# Patient Record
Sex: MALE | Race: WHITE | Employment: FULL TIME | URBAN - METROPOLITAN AREA
[De-identification: names, ages, dates, MRNs, and addresses within clinical notes are randomized per-mention and may not be internally consistent; named-entity substitution may affect disease eponyms.]

---

## 2017-04-04 ENCOUNTER — HOSPITAL ENCOUNTER (OUTPATIENT)
Dept: GENERAL RADIOLOGY | Facility: HOSPITAL | Age: 42
Discharge: HOME | End: 2017-04-04
Attending: PREVENTIVE MEDICINE

## 2017-04-04 ENCOUNTER — OFFICE VISIT (OUTPATIENT)
Dept: OTHER | Facility: HOSPITAL | Age: 42
End: 2017-04-04
Attending: PREVENTIVE MEDICINE

## 2017-04-04 DIAGNOSIS — Z00.00 ANNUAL PHYSICAL EXAM: Primary | ICD-10-CM

## 2017-04-04 DIAGNOSIS — Z01.84 IMMUNITY STATUS TESTING: ICD-10-CM

## 2017-04-04 PROCEDURE — 86706 HEP B SURFACE ANTIBODY: CPT

## 2017-04-05 NOTE — H&P
PATIENT'S NAME: Jyotsna Cao   ATTENDING PHYSICIAN: Francisca Villegas M.D.    PATIENT ACCOUNT #: [de-identified] LOCATION: [address] [place] [address] RECORD #: [id number] YOB: 1975   DATE OF SERVICE: 04/04/2017       EXECUTIVE HISTORY AND

## 2017-04-05 NOTE — H&P
PATIENT'S NAME: [facility]   ATTENDING PHYSICIAN: Jimena Agosto M.D.    PATIENT ACCOUNT #: [de-identified] LOCATION: [address] [place] [address] RECORD #: [id number] YOB: 1975   DATE OF SERVICE: 04/04/2017       EXECUTIVE HISTORY AND corrective lenses, 20/16 for distance. He can identify red, yellow, and green. Peripheral vision is intact.     Three Hemoccult cards were submitted and are all normal.     LABORATORY DATA:  Cholesterol panel is normal with a triglyceride elevation of 187

## 2018-03-09 ENCOUNTER — HOSPITAL ENCOUNTER (OUTPATIENT)
Dept: CV DIAGNOSTICS | Age: 43
Discharge: HOME | End: 2018-03-09
Attending: PREVENTIVE MEDICINE

## 2018-03-09 ENCOUNTER — OFFICE VISIT (OUTPATIENT)
Dept: OTHER | Facility: HOSPITAL | Age: 43
End: 2018-03-09

## 2018-03-09 ENCOUNTER — APPOINTMENT (OUTPATIENT)
Dept: OTHER | Age: 43
End: 2018-03-09
Attending: FAMILY MEDICINE

## 2018-03-09 DIAGNOSIS — Z00.00 PHYSICAL EXAM, ANNUAL: Primary | ICD-10-CM

## 2018-03-09 DIAGNOSIS — Z00.00 PHYSICAL EXAM, ANNUAL: ICD-10-CM

## 2019-03-13 ENCOUNTER — OFFICE VISIT (OUTPATIENT)
Dept: OTHER | Age: 44
End: 2019-03-13
Attending: FAMILY MEDICINE

## 2019-03-13 DIAGNOSIS — Z00.00 ANNUAL PHYSICAL EXAM: Primary | ICD-10-CM

## 2019-03-13 PROCEDURE — 93005 ELECTROCARDIOGRAM TRACING: CPT

## 2019-03-14 LAB
ATRIAL RATE: 63 BPM
P AXIS: 39 DEGREES
P-R INTERVAL: 166 MS
Q-T INTERVAL: 394 MS
QRS DURATION: 108 MS
QTC CALCULATION (BEZET): 403 MS
R AXIS: -12 DEGREES
T AXIS: 24 DEGREES
VENTRICULAR RATE: 63 BPM

## 2020-01-13 ENCOUNTER — APPOINTMENT (OUTPATIENT)
Dept: OTHER | Facility: HOSPITAL | Age: 45
End: 2020-01-13
Attending: PREVENTIVE MEDICINE

## 2020-03-16 ENCOUNTER — HOSPITAL ENCOUNTER (OUTPATIENT)
Dept: CV DIAGNOSTICS | Age: 45
Discharge: HOME | End: 2020-03-16
Attending: FAMILY MEDICINE

## 2020-03-16 ENCOUNTER — OFFICE VISIT (OUTPATIENT)
Dept: OTHER | Age: 45
End: 2020-03-16
Attending: FAMILY MEDICINE

## 2020-03-16 DIAGNOSIS — Z00.00 ANNUAL PHYSICAL EXAM: ICD-10-CM

## 2020-03-16 DIAGNOSIS — Z00.00 ANNUAL PHYSICAL EXAM: Primary | ICD-10-CM

## 2021-02-09 ENCOUNTER — OFFICE VISIT (OUTPATIENT)
Dept: OTHER | Facility: HOSPITAL | Age: 46
End: 2021-02-09
Attending: PREVENTIVE MEDICINE

## 2021-02-09 DIAGNOSIS — Z00.00 ANNUAL PHYSICAL EXAM: Primary | ICD-10-CM

## 2021-02-09 LAB
ATRIAL RATE: 67 BPM
P AXIS: 51 DEGREES
P-R INTERVAL: 186 MS
Q-T INTERVAL: 370 MS
QRS DURATION: 106 MS
QTC CALCULATION (BEZET): 390 MS
R AXIS: -4 DEGREES
T AXIS: 28 DEGREES
VENTRICULAR RATE: 67 BPM

## 2022-03-09 ENCOUNTER — OFFICE VISIT (OUTPATIENT)
Dept: OTHER | Facility: HOSPITAL | Age: 47
End: 2022-03-09
Attending: PREVENTIVE MEDICINE

## 2022-03-09 ENCOUNTER — HOSPITAL ENCOUNTER (OUTPATIENT)
Dept: GENERAL RADIOLOGY | Facility: HOSPITAL | Age: 47
Discharge: HOME | End: 2022-03-09
Attending: PREVENTIVE MEDICINE

## 2022-03-09 DIAGNOSIS — Z00.00 ANNUAL PHYSICAL EXAM: Primary | ICD-10-CM

## 2022-03-09 DIAGNOSIS — Z00.00 ANNUAL PHYSICAL EXAM: ICD-10-CM

## 2022-03-10 LAB
ATRIAL RATE: 70 BPM
P AXIS: 48 DEGREES
P-R INTERVAL: 176 MS
Q-T INTERVAL: 382 MS
QTC CALCULATION (BEZET): 412 MS
R AXIS: 3 DEGREES
T AXIS: 28 DEGREES
VENTRICULAR RATE: 70 BPM

## 2023-03-10 ENCOUNTER — OFFICE VISIT (OUTPATIENT)
Dept: OTHER | Facility: HOSPITAL | Age: 48
End: 2023-03-10
Attending: PREVENTIVE MEDICINE

## 2023-03-10 DIAGNOSIS — Z00.00 ANNUAL PHYSICAL EXAM: Primary | ICD-10-CM

## 2023-03-10 LAB
ATRIAL RATE: 68 BPM
P AXIS: 46 DEGREES
P-R INTERVAL: 174 MS
Q-T INTERVAL: 382 MS
QRS DURATION: 106 MS
QTC CALCULATION (BEZET): 406 MS
R AXIS: -5 DEGREES
T AXIS: 23 DEGREES
VENTRICULAR RATE: 68 BPM

## 2023-03-10 PROCEDURE — 93005 ELECTROCARDIOGRAM TRACING: CPT

## 2024-02-21 ENCOUNTER — OFFICE VISIT (OUTPATIENT)
Dept: OTHER | Facility: HOSPITAL | Age: 49
End: 2024-02-21
Attending: PREVENTIVE MEDICINE

## 2024-02-21 DIAGNOSIS — Z02.89 VISIT FOR OCCUPATIONAL HEALTH EXAMINATION: Primary | ICD-10-CM

## 2024-02-21 LAB
ATRIAL RATE: 79 BPM
P AXIS: 59 DEGREES
P-R INTERVAL: 182 MS
Q-T INTERVAL: 372 MS
QRS DURATION: 106 MS
QTC CALCULATION (BEZET): 426 MS
R AXIS: -10 DEGREES
T AXIS: 29 DEGREES
VENTRICULAR RATE: 79 BPM

## 2024-02-21 PROCEDURE — 93005 ELECTROCARDIOGRAM TRACING: CPT

## 2024-02-22 LAB
ATRIAL RATE: 66 BPM
P AXIS: 41 DEGREES
P-R INTERVAL: 178 MS
Q-T INTERVAL: 382 MS
QRS DURATION: 106 MS
QTC CALCULATION (BEZET): 400 MS
R AXIS: -10 DEGREES
T AXIS: 24 DEGREES
VENTRICULAR RATE: 66 BPM

## 2025-03-20 ENCOUNTER — OFFICE VISIT (OUTPATIENT)
Dept: OTHER | Facility: HOSPITAL | Age: 50
End: 2025-03-20
Attending: PREVENTIVE MEDICINE

## 2025-03-20 DIAGNOSIS — Z02.89 VISIT FOR OCCUPATIONAL HEALTH EXAMINATION: Primary | ICD-10-CM

## 2025-03-20 LAB
ATRIAL RATE: 87 BPM
P AXIS: 59 DEGREES
P-R INTERVAL: 176 MS
Q-T INTERVAL: 366 MS
QRS DURATION: 102 MS
QTC CALCULATION (BEZET): 440 MS
R AXIS: -18 DEGREES
T AXIS: 30 DEGREES
VENTRICULAR RATE: 87 BPM

## 2025-03-20 PROCEDURE — 93005 ELECTROCARDIOGRAM TRACING: CPT

## (undated) NOTE — MR AVS SNAPSHOT
After Visit Summary   4/4/2017    Wilman Earl    MRN: [id number]           Visit Information        Provider Department Dept Phone    4/4/2017  7:30 AM [address] [phone]      Follow-up Information     None      Diagno Confirmed by Vivian Young M.D., [facility] ([id number]) on 4/4/2017 4:01:13 PM         Result Summary for XR [facility] VIEWS      Narrative     PROCEDURE:  XR [facility] VIEWS     INDICATIONS:  Z00.00 Encounter for general adult medical examination witho